# Patient Record
Sex: MALE | Race: WHITE | NOT HISPANIC OR LATINO | Employment: UNEMPLOYED | URBAN - METROPOLITAN AREA
[De-identification: names, ages, dates, MRNs, and addresses within clinical notes are randomized per-mention and may not be internally consistent; named-entity substitution may affect disease eponyms.]

---

## 2018-01-19 ENCOUNTER — OFFICE VISIT (OUTPATIENT)
Dept: MEDICAL GROUP | Facility: MEDICAL CENTER | Age: 18
End: 2018-01-19
Attending: INTERNAL MEDICINE
Payer: MEDICAID

## 2018-01-19 VITALS
HEART RATE: 100 BPM | OXYGEN SATURATION: 98 % | TEMPERATURE: 98.1 F | BODY MASS INDEX: 17.71 KG/M2 | DIASTOLIC BLOOD PRESSURE: 68 MMHG | SYSTOLIC BLOOD PRESSURE: 100 MMHG | RESPIRATION RATE: 16 BRPM | HEIGHT: 74 IN | WEIGHT: 138 LBS

## 2018-01-19 DIAGNOSIS — H35.52 USHER SYNDROME: ICD-10-CM

## 2018-01-19 DIAGNOSIS — M22.2X2 PATELLOFEMORAL PAIN SYNDROME OF BOTH KNEES: ICD-10-CM

## 2018-01-19 DIAGNOSIS — Z23 NEED FOR VACCINATION: ICD-10-CM

## 2018-01-19 DIAGNOSIS — H90.3 USHER SYNDROME: ICD-10-CM

## 2018-01-19 DIAGNOSIS — M22.2X1 PATELLOFEMORAL PAIN SYNDROME OF BOTH KNEES: ICD-10-CM

## 2018-01-19 PROCEDURE — 99204 OFFICE O/P NEW MOD 45 MIN: CPT | Performed by: INTERNAL MEDICINE

## 2018-01-19 PROCEDURE — 99203 OFFICE O/P NEW LOW 30 MIN: CPT | Mod: 25 | Performed by: INTERNAL MEDICINE

## 2018-01-19 PROCEDURE — 90686 IIV4 VACC NO PRSV 0.5 ML IM: CPT | Performed by: INTERNAL MEDICINE

## 2018-01-19 PROCEDURE — 90471 IMMUNIZATION ADMIN: CPT | Performed by: INTERNAL MEDICINE

## 2018-01-19 ASSESSMENT — PATIENT HEALTH QUESTIONNAIRE - PHQ9: CLINICAL INTERPRETATION OF PHQ2 SCORE: 0

## 2018-01-19 ASSESSMENT — PAIN SCALES - GENERAL: PAINLEVEL: NO PAIN

## 2018-01-19 NOTE — ASSESSMENT & PLAN NOTE
Patient reports having surgery on both of his knees for patellofemoral syndrome when he was in middle school, about 6 years ago. He reports that his knees are still a little bit unstable but the kneecaps no longer dislocated and he doesn't have too much pain related to this. He doesn't currently follow with an orthopedic surgeon.

## 2018-01-19 NOTE — PROGRESS NOTES
Kabriel Duane Hoover is a 17 y.o. male here for history of Usher syndrome, establish care, general physical exam  HPI:    Usher syndrome  Patient reports that he was diagnosed with posture syndrome by his ENT doctor, Dr. Rodriguez when he was about 12 years old. He currently wears hearing aids and he sees Dr. Rodriguez yearly. He uses [employer] for his hearing aid calibration.  He does not think he needs referrals to these places currently as he is already an established patient. For his vision loss, he sees Dr. Jurado. He reports that his glasses prescription has not changed recently. He feels that his hearing and vision have been stable for the past several years.     History of patellofemoral pain syndrome of both knees  Patient reports having surgery on both of his knees for patellofemoral syndrome when he was in middle school, about 6 years ago. He reports that his knees are still a little bit unstable but the kneecaps no longer dislocated and he doesn't have too much pain related to this. He doesn't currently follow with an orthopedic surgeon.    Current medicines (including changes today)  No current outpatient prescriptions on file.     No current facility-administered medications for this visit.      He  has a past medical history of Hearing loss; Patellofemoral arthralgia of both knees; Usher syndrome; and Vision loss.  He  has a past surgical history that includes other orthopedic surgery (2012).  Social History   Substance Use Topics   • Smoking status: Never Smoker   • Smokeless tobacco: Never Used   • Alcohol use No     Social History     Social History Narrative   • No narrative on file     Family History   Problem Relation Age of Onset   • Psychiatry Father      deperession   • Hypertension Father    • Psychiatry Brother    • Hypertension Paternal Grandmother    • Hypertension Paternal Grandfather    • Cancer Neg Hx          ROS  As above in HPI  All other systems reviewed and are negative      "Objective:     Blood pressure 100/68, pulse 100, temperature 36.7 °C (98.1 °F), resp. rate 16, height 1.88 m (6' 2\"), weight 62.6 kg (138 lb), SpO2 98 %. Body mass index is 17.72 kg/m².  Physical Exam:    Constitutional: Alert, no distress.  Skin: Warm, dry, good turgor, no rashes in visible areas.  Eye: Equal, round and reactive, conjunctiva clear, lids normal, wears glasses.  ENMT: Lips without lesions, good dentition, oropharynx clear, Hearing aids in place.  Neck: Trachea midline, no masses, no thyromegaly. No cervical or supraclavicular lymphadenopathy.  Respiratory: Unlabored respiratory effort, lungs clear to auscultation, no wheezes, no ronchi.  Cardiovascular: Normal S1, S2, no murmur, no edema.  Abdomen: Soft, non-tender, no masses, no hepatosplenomegaly.  Psych: Alert and oriented x3, normal affect and mood.      Assessment and Plan:   The following treatment plan was discussed    1. Usher syndrome  Patient is established with Dr. Rodriguez of ENT as well as [facility]. He also sees Dr. Jurado for his vision. He feels that his symptoms are stable at this time. He generally checks and with each specialists about once a year. He will continue specialty care.  -Continue follow-up with ENT, [facility], opthalmology    2. Patellofemoral pain syndrome of both knees  S/p surgical repair with good results.  Will continue to monitor    3. Need for vaccination  - Flu Quad Inj >3 Year Pre-Filled PF        Followup: Return if symptoms worsen or fail to improve, for annual.         "

## 2018-01-19 NOTE — ASSESSMENT & PLAN NOTE
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 Rodriguez when he was about 12 years old. He currently wears hearing aids and he sees Dr. Rodriguez yearly. He uses [employer] for his hearing aid calibration.  He does not think he needs referrals to these places currently as he is already an established patient. For his vision loss, he sees Dr. Jurado. He reports that his glasses prescription has not changed recently. He feels that his hearing and vision have been stable for the past several years.

## 2018-04-25 ENCOUNTER — OFFICE VISIT (OUTPATIENT)
Dept: MEDICAL GROUP | Facility: MEDICAL CENTER | Age: 18
End: 2018-04-25
Attending: INTERNAL MEDICINE
Payer: MEDICAID

## 2018-04-25 VITALS
SYSTOLIC BLOOD PRESSURE: 110 MMHG | WEIGHT: 146 LBS | TEMPERATURE: 97.9 F | HEART RATE: 98 BPM | DIASTOLIC BLOOD PRESSURE: 70 MMHG | BODY MASS INDEX: 18.74 KG/M2 | OXYGEN SATURATION: 97 % | HEIGHT: 74 IN | RESPIRATION RATE: 16 BRPM

## 2018-04-25 DIAGNOSIS — H00.014 HORDEOLUM EXTERNUM OF LEFT UPPER EYELID: ICD-10-CM

## 2018-04-25 PROCEDURE — 99213 OFFICE O/P EST LOW 20 MIN: CPT | Performed by: INTERNAL MEDICINE

## 2018-04-25 PROCEDURE — 99212 OFFICE O/P EST SF 10 MIN: CPT | Performed by: INTERNAL MEDICINE

## 2018-04-25 ASSESSMENT — PAIN SCALES - GENERAL: PAINLEVEL: NO PAIN

## 2018-04-25 NOTE — PROGRESS NOTES
"Subjective:   Kabriel Duane Hoover is a 18 y.o. male here today for left eyelid pain    Hordeolum externum of left upper eyelid  Patient reports that about 3 days ago, he developed a raised sore over the left eyelid. He states it looked like a pimple. He tried to pop it and did get a small amount of drainage. And, he has had increased tenderness and swelling of the eyelid. He denies changes in vision, eye watering or discharge, eye redness, eye pain.       Current medicines (including changes today)  No current outpatient prescriptions on file.     No current facility-administered medications for this visit.      He  has a past medical history of Hearing loss; Patellofemoral arthralgia of both knees; Usher syndrome; and Vision loss.    ROS   As above in HPI     Objective:     Blood pressure 110/70, pulse 98, temperature 36.6 °C (97.9 °F), resp. rate 16, height 1.88 m (6' 2.02\"), weight 66.2 kg (146 lb), SpO2 97 %. Body mass index is 18.74 kg/m².   Physical Exam:  Constitutional: Alert, no distress.  Skin: Warm, dry, good turgor, no rashes in visible areas.  Eye: Equal, round and reactive, conjunctiva clear, mild redness and swelling of the medial left upper lid consistent with a stye, no eye redness, no tenderness to palpation of the globe or periorbital region.  Psych: Alert and oriented x3, normal affect and mood.        Assessment and Plan:   The following treatment plan was discussed    1. Hordeolum externum of left upper eyelid  Exam is consistent with a stye. I advised patient to treat with warm compresses 4 times a day until the lesion resolves. We discussed that if there is no improvement with this therapy in the next week, he should let us know.  -Conservative management with warm compress 4 times a day        Followup: Return if symptoms worsen or fail to improve.         "

## 2018-04-25 NOTE — ASSESSMENT & PLAN NOTE
Patient reports that about 3 days ago, he developed a raised sore over the left eyelid. He states it looked like a pimple. He tried to pop it and did get a small amount of drainage. And, he has had increased tenderness and swelling of the eyelid. He denies changes in vision, eye watering or discharge, eye redness, eye pain.

## 2018-05-25 ENCOUNTER — OFFICE VISIT (OUTPATIENT)
Dept: MEDICAL GROUP | Facility: MEDICAL CENTER | Age: 18
End: 2018-05-25
Attending: FAMILY MEDICINE
Payer: MEDICAID

## 2018-05-25 VITALS
SYSTOLIC BLOOD PRESSURE: 112 MMHG | HEIGHT: 74 IN | OXYGEN SATURATION: 95 % | HEART RATE: 88 BPM | TEMPERATURE: 97.8 F | RESPIRATION RATE: 16 BRPM | BODY MASS INDEX: 18.1 KG/M2 | DIASTOLIC BLOOD PRESSURE: 58 MMHG | WEIGHT: 141 LBS

## 2018-05-25 DIAGNOSIS — K12.0 ORAL APHTHOUS ULCER: ICD-10-CM

## 2018-05-25 DIAGNOSIS — H00.014 HORDEOLUM EXTERNUM OF LEFT UPPER EYELID: ICD-10-CM

## 2018-05-25 PROCEDURE — 99213 OFFICE O/P EST LOW 20 MIN: CPT | Performed by: FAMILY MEDICINE

## 2018-05-25 PROCEDURE — 99212 OFFICE O/P EST SF 10 MIN: CPT | Performed by: FAMILY MEDICINE

## 2018-05-25 RX ORDER — CEPHALEXIN 500 MG/1
500 CAPSULE ORAL 3 TIMES DAILY
Qty: 21 CAP | Refills: 0 | Status: SHIPPED | OUTPATIENT
Start: 2018-05-25 | End: 2019-12-20

## 2018-05-25 ASSESSMENT — PAIN SCALES - GENERAL: PAINLEVEL: 3=SLIGHT PAIN

## 2018-05-25 NOTE — PROGRESS NOTES
"Subjective:      Kabriel Duane Hoover is a 18 y.o. male who presents with Dental Pain            HPI 1. Hordeolum-patient notes that previous in flames tender papule in the center of his left upper eyelid is partially improved for using warm compresses as recommended by Dr. Encinas at his last visit. There is been no mattering or drainage in his eye.  2. Oral aphthous ulcer-patient notes just onset the last 24 hours of an exquisitely tender spot above one of his left canine teeth on the gum immediately above the tooth. No tenderness over the tongue or sore throat.    ROS negative for blurred vision, difficulty swallowing, chest pain       Objective:     /58   Pulse 88   Temp 36.6 °C (97.8 °F)   Resp 16   Ht 1.88 m (6' 2.02\")   Wt 64 kg (141 lb)   SpO2 95%   BMI 18.10 kg/m²      Physical Exam  General-alert cooperative male in no acute distress  Oropharynx-4 mm shallow white-based erosions noted about 5-6 mm above the left sided upper jaw canine tooth. No significant swelling of the neighboring gum tissue. Tongue is midline and unremarkable. Oropharynx shows moist mucosa  Eyes-negative for conjunctival injection. There is a 3-4 mm nodule barely palpable in the lower central left upper eyelid. No mattering is seen on the eyelashes.          Assessment/Plan:     1. Hordeolum externum of left upper eyelid      2. Oral aphthous ulcer    Plan: 1. Trial of Keflex 500 mg 3 times a day ×7 days  2. Continue intermittent warm compresses to the left eye  3. If the left eyelid nodule grows into more of a pea-sized but nontender lesion would clinically suspected chalazion and arrange for ophthalmologic referral  4. Recommend ibuprofen or Tylenol to deal with pain associated with current oral ulcer    "

## 2018-07-03 ENCOUNTER — HOSPITAL ENCOUNTER (EMERGENCY)
Facility: MEDICAL CENTER | Age: 18
End: 2018-07-03
Attending: EMERGENCY MEDICINE
Payer: MEDICAID

## 2018-07-03 VITALS
OXYGEN SATURATION: 95 % | WEIGHT: 140 LBS | HEIGHT: 74 IN | SYSTOLIC BLOOD PRESSURE: 115 MMHG | BODY MASS INDEX: 17.97 KG/M2 | TEMPERATURE: 97.7 F | RESPIRATION RATE: 14 BRPM | HEART RATE: 92 BPM | DIASTOLIC BLOOD PRESSURE: 58 MMHG

## 2018-07-03 DIAGNOSIS — F41.9 ANXIETY: ICD-10-CM

## 2018-07-03 DIAGNOSIS — R25.1 OCCASIONAL TREMORS: ICD-10-CM

## 2018-07-03 DIAGNOSIS — T43.225A: ICD-10-CM

## 2018-07-03 LAB
ANION GAP SERPL CALC-SCNC: 13 MMOL/L (ref 0–11.9)
BASOPHILS # BLD AUTO: 0.5 % (ref 0–1.8)
BASOPHILS # BLD: 0.06 K/UL (ref 0–0.12)
BUN SERPL-MCNC: 18 MG/DL (ref 8–22)
CALCIUM SERPL-MCNC: 9.8 MG/DL (ref 8.5–10.5)
CHLORIDE SERPL-SCNC: 105 MMOL/L (ref 96–112)
CO2 SERPL-SCNC: 21 MMOL/L (ref 20–33)
CREAT SERPL-MCNC: 1.09 MG/DL (ref 0.5–1.4)
EOSINOPHIL # BLD AUTO: 0.05 K/UL (ref 0–0.51)
EOSINOPHIL NFR BLD: 0.4 % (ref 0–6.9)
ERYTHROCYTE [DISTWIDTH] IN BLOOD BY AUTOMATED COUNT: 37.2 FL (ref 35.9–50)
GLUCOSE SERPL-MCNC: 91 MG/DL (ref 65–99)
HCT VFR BLD AUTO: 47 % (ref 42–52)
HGB BLD-MCNC: 17.4 G/DL (ref 14–18)
IMM GRANULOCYTES # BLD AUTO: 0.04 K/UL (ref 0–0.11)
IMM GRANULOCYTES NFR BLD AUTO: 0.3 % (ref 0–0.9)
LYMPHOCYTES # BLD AUTO: 2.55 K/UL (ref 1–4.8)
LYMPHOCYTES NFR BLD: 21.6 % (ref 22–41)
MAGNESIUM SERPL-MCNC: 2 MG/DL (ref 1.5–2.5)
MCH RBC QN AUTO: 31.1 PG (ref 27–33)
MCHC RBC AUTO-ENTMCNC: 37 G/DL (ref 33.7–35.3)
MCV RBC AUTO: 83.9 FL (ref 81.4–97.8)
MONOCYTES # BLD AUTO: 0.58 K/UL (ref 0–0.85)
MONOCYTES NFR BLD AUTO: 4.9 % (ref 0–13.4)
NEUTROPHILS # BLD AUTO: 8.52 K/UL (ref 1.82–7.42)
NEUTROPHILS NFR BLD: 72.3 % (ref 44–72)
NRBC # BLD AUTO: 0 K/UL
NRBC BLD-RTO: 0 /100 WBC
PLATELET # BLD AUTO: 255 K/UL (ref 164–446)
PMV BLD AUTO: 9.8 FL (ref 9–12.9)
POTASSIUM SERPL-SCNC: 3.9 MMOL/L (ref 3.6–5.5)
RBC # BLD AUTO: 5.6 M/UL (ref 4.7–6.1)
SODIUM SERPL-SCNC: 139 MMOL/L (ref 135–145)
WBC # BLD AUTO: 11.8 K/UL (ref 4.8–10.8)

## 2018-07-03 PROCEDURE — 85025 COMPLETE CBC W/AUTO DIFF WBC: CPT

## 2018-07-03 PROCEDURE — 80048 BASIC METABOLIC PNL TOTAL CA: CPT

## 2018-07-03 PROCEDURE — 83735 ASSAY OF MAGNESIUM: CPT

## 2018-07-03 PROCEDURE — 96374 THER/PROPH/DIAG INJ IV PUSH: CPT

## 2018-07-03 PROCEDURE — 700111 HCHG RX REV CODE 636 W/ 250 OVERRIDE (IP): Performed by: EMERGENCY MEDICINE

## 2018-07-03 PROCEDURE — 99284 EMERGENCY DEPT VISIT MOD MDM: CPT

## 2018-07-03 RX ORDER — LORAZEPAM 2 MG/ML
0.5 INJECTION INTRAMUSCULAR ONCE
Status: COMPLETED | OUTPATIENT
Start: 2018-07-03 | End: 2018-07-03

## 2018-07-03 RX ADMIN — LORAZEPAM 0.5 MG: 2 INJECTION INTRAMUSCULAR; INTRAVENOUS at 18:14

## 2018-07-03 ASSESSMENT — PAIN SCALES - GENERAL: PAINLEVEL_OUTOF10: 6

## 2018-07-03 NOTE — ED TRIAGE NOTES
Pt to triage via hospital w/c.  Chief Complaint   Patient presents with   • Medication Reaction     possibly d/t zoloft, new medication, started shaking yesterday hand and torso, and starting two hours pt started having shaking all over, pt a/o x4 and talking through episode.     Pt having jerky movements, unable to control.  Charge informed of pt and working on room.  Pt to [facility], Pt Informed regarding triage process and verbalized understanding to inform triage tech or RN for any changes in condition.

## 2018-07-04 NOTE — ED NOTES
"Pt reports depression for past 4-5 months. This is the first medication attempted for treatment. Pt denies any other medication use at home. Pt removed hearing aids and gave them to his girlfriend and then to his aunt for safe keeping. Pt reports \"usher syndrome\" wearing hearing aids since 10th grade.   "

## 2018-07-04 NOTE — ED NOTES
Started zoloft 50mg 8 days ago and pt began fidgeting and squirming uncontrollably for past 2 hours. Girlfriend and aunt at bedside for support.

## 2018-07-04 NOTE — ED NOTES
Discussed Discharge instructions, F/U instructions, and medication instructions with Pt and family. Questions answered. Pt and family escorted out of ED in stable condition.

## 2018-07-04 NOTE — ED NOTES
"\"It happened the past 2 mornings but I was also scared and nervous so I don't know if that's the same thing but.\"  "

## 2018-07-04 NOTE — ED PROVIDER NOTES
ED Provider Note    Scribed for Carol Penn M.D. by Bre Ramirez. 7/3/2018, 5:21 PM.    Primary care provider: Devora Encinas M.D.  Means of arrival: Walk in  History obtained from: Patient  History limited by: None    CHIEF COMPLAINT  Chief Complaint   Patient presents with   • Medication Reaction     possibly d/t zoloft, new medication, started shaking yesterday hand and torso, and starting two hours pt started having shaking all over, pt a/o x4 and talking through episode.       HPI  Kabriel Duane Hoover is a 18 y.o. Male with a history of anxiety and PTSD who presents to the Emergency Department of medication reaction after he received Zoloft 8 days ago for anxiety by Dr. Robb, [facility]. Patient states that he began trembling yesterday, but the trembling ceased. However, this morning, his jaw began trembling and then his body began trembling 3 hours ago. He has never had any medications for anxiety in the past. He denies a need to flee or run away, nausea, diarrhea, fevers, or rashes. Patient reports that he is slightly constipated but is able to have a bowel movement and urinate. He has no known drug allergies.    REVIEW OF SYSTEMS  Pertinent positives include trembling. Pertinent negatives include no nausea, diarrhea, fevers, or rahses. E  See HPI for further details.     PAST MEDICAL HISTORY  Past Medical History:   Diagnosis Date   • Hearing loss    • Patellofemoral arthralgia of both knees    • Usher syndrome    • Vision loss    Anxiety and PTSD    SURGICAL HISTORY  Past Surgical History:   Procedure Laterality Date   • OTHER ORTHOPEDIC SURGERY  2012    bilateral knee surgery for patellofemoral syndrome       SOCIAL HISTORY  Social History   Substance Use Topics   • Smoking status: Never Smoker   • Smokeless tobacco: Never Used   • Alcohol use No      History   Drug Use No       FAMILY HISTORY  Family History   Problem Relation Age of Onset   • Psychiatry Father      deperession  "  • Hypertension Father    • Psychiatry Brother    • Hypertension Paternal Grandmother    • Hypertension Paternal Grandfather    • Cancer Neg Hx        CURRENT MEDICATIONS  Zoloft      ALLERGIES  No Known Allergies    PHYSICAL EXAM  VITAL SIGNS: /100   Pulse (!) 102   Temp 36.5 °C (97.7 °F) (Temporal)   Resp 20   Ht 1.88 m (6' 2\")   Wt 63.5 kg (140 lb)   SpO2 98%   BMI 17.97 kg/m²   Vitals reviewed.    Consitutional: Well-developed, well-nourished. Mild distress.  HENT: Normocephalic, right external ear normal, left external ear normal, oropharynx clear and moist.  Eyes: Conjunctivae normal, extraocular movements normal. Negative for: discharge in right and left eye, icterus. PERRLA at 4 mm  Neck: Range of motion normal, supple. Negative for cervical adenopathy.  Cardiovascular: Mild tachycardia, regular rhythm, heart sounds normal, intact distal pulses. Negative for: murmur, rub, gallop.  Pulmonary/Chest Wall: Effort normal, shallow breathing. Negative for: wheezes, rales, rhonchi.   Abdominal: Soft, bowel sounds normal. Negative for: distention, tenderness, rebound, guarding.  Musculoskeletal: Normal range of motion. Negative for edema. Muscular myoclonic movements of the torso and lower extremities that is fully distractible, no clonus   Neurological: Alert and oriented x3. No focal deficits  Skin: Warm, dry. Negative for rash.  Psych: Odd affect as the patient is calm while having convulsions.    DIAGNOSTIC STUDIES / PROCEDURES    LABS  Results for orders placed or performed during the hospital encounter of 07/03/18   CBC WITH DIFFERENTIAL   Result Value Ref Range    WBC 11.8 (H) 4.8 - 10.8 K/uL    RBC 5.60 4.70 - 6.10 M/uL    Hemoglobin 17.4 14.0 - 18.0 g/dL    Hematocrit 47.0 42.0 - 52.0 %    MCV 83.9 81.4 - 97.8 fL    MCH 31.1 27.0 - 33.0 pg    MCHC 37.0 (H) 33.7 - 35.3 g/dL    RDW 37.2 35.9 - 50.0 fL    Platelet Count 255 164 - 446 K/uL    MPV 9.8 9.0 - 12.9 fL    Neutrophils-Polys 72.30 (H) " 44.00 - 72.00 %    Lymphocytes 21.60 (L) 22.00 - 41.00 %    Monocytes 4.90 0.00 - 13.40 %    Eosinophils 0.40 0.00 - 6.90 %    Basophils 0.50 0.00 - 1.80 %    Immature Granulocytes 0.30 0.00 - 0.90 %    Nucleated RBC 0.00 /100 WBC    Neutrophils (Absolute) 8.52 (H) 1.82 - 7.42 K/uL    Lymphs (Absolute) 2.55 1.00 - 4.80 K/uL    Monos (Absolute) 0.58 0.00 - 0.85 K/uL    Eos (Absolute) 0.05 0.00 - 0.51 K/uL    Baso (Absolute) 0.06 0.00 - 0.12 K/uL    Immature Granulocytes (abs) 0.04 0.00 - 0.11 K/uL    NRBC (Absolute) 0.00 K/uL   BASIC METABOLIC PANEL   Result Value Ref Range    Sodium 139 135 - 145 mmol/L    Potassium 3.9 3.6 - 5.5 mmol/L    Chloride 105 96 - 112 mmol/L    Co2 21 20 - 33 mmol/L    Glucose 91 65 - 99 mg/dL    Bun 18 8 - 22 mg/dL    Creatinine 1.09 0.50 - 1.40 mg/dL    Calcium 9.8 8.5 - 10.5 mg/dL    Anion Gap 13.0 (H) 0.0 - 11.9   MAGNESIUM   Result Value Ref Range    Magnesium 2.0 1.5 - 2.5 mg/dL   ESTIMATED GFR   Result Value Ref Range    GFR If African American >60 >60 mL/min/1.73 m 2    GFR If Non African American >60 >60 mL/min/1.73 m 2     All labs reviewed by me.    COURSE & MEDICAL DECISION MAKING  Nursing notes, VS, PMSFHx reviewed in chart.    5:21 PM Patient seen and examined at bedside. The patient presents following a medication reaction and the differential diagnosis includes but is not limited to electrolyte abnormality, anxiety. Ordered estimated GFR, CBC, BMP, and magnesium. Patient will be treated with Ativan 0.5 mg for his symptoms.      6:30 PM Patient's labs were reviewed.    6:37 PM Patient was reevaluated at bedside. Patient is resting comfortably and reports that he is feeling better.  He stopped having tremors when the IV was placed prior to the administration of Ativan.  Discussed lab results with the patient and informed them that he will be discharged. The patient agrees to discharge home.     The patient will return for new or worsening symptoms and is stable at the time of  discharge.    DISPOSITION:  Patient will be discharged home in stable condition.    FOLLOW UP:  Devora Encinas M.D.  [address] [id number] [phone]    Call in 2 days        OUTPATIENT MEDICATIONS:  New Prescriptions    No medications on file         FINAL IMPRESSION  1. Occasional tremors    2. Adverse reaction to selective serotonin reuptake inhibitor, initial encounter    3. Anxiety          Bre REA (Scribe), am scribing for, and in the presence of, Carol Penn M.D..    Electronically signed by: Bre Ramirez (Scribsera), 7/3/2018    Carol REA M.D. personally performed the services described in this documentation, as scribed by Bre Ramirez in my presence, and it is both accurate and complete.    The note accurately reflects work and decisions made by me.  Carol Penn  7/4/2018  12:16 AM

## 2018-11-13 ENCOUNTER — TELEPHONE (OUTPATIENT)
Dept: MEDICAL GROUP | Facility: MEDICAL CENTER | Age: 18
End: 2018-11-13

## 2018-11-13 DIAGNOSIS — H35.52 USHER SYNDROME: ICD-10-CM

## 2018-11-13 DIAGNOSIS — H90.3 USHER SYNDROME: ICD-10-CM

## 2018-11-14 NOTE — TELEPHONE ENCOUNTER
Pt left message asking to have a referral to his ear doctor as the DR will not see him with out a referral.

## 2018-11-14 NOTE — TELEPHONE ENCOUNTER
Referral placed.  I will ask the referrals department to send to Dr. Rodriguez's office.  Please give patient the referrals department contact info in case he has any questions/problems.  Devora Encinas M.D.

## 2019-02-22 ENCOUNTER — TELEPHONE (OUTPATIENT)
Dept: MEDICAL GROUP | Facility: MEDICAL CENTER | Age: 19
End: 2019-02-22

## 2019-02-22 DIAGNOSIS — H90.3 USHER SYNDROME: ICD-10-CM

## 2019-02-22 DIAGNOSIS — H35.52 USHER SYNDROME: ICD-10-CM

## 2019-02-23 NOTE — TELEPHONE ENCOUNTER
Pt has an appointment scheduled for Wednesday with [facility] however they informed him today that he will need a referral for insurance to cover this appt.

## 2019-12-20 ENCOUNTER — OFFICE VISIT (OUTPATIENT)
Dept: MEDICAL GROUP | Facility: MEDICAL CENTER | Age: 19
End: 2019-12-20
Attending: INTERNAL MEDICINE
Payer: MEDICAID

## 2019-12-20 VITALS
RESPIRATION RATE: 16 BRPM | DIASTOLIC BLOOD PRESSURE: 74 MMHG | WEIGHT: 159 LBS | HEART RATE: 80 BPM | HEIGHT: 74 IN | TEMPERATURE: 98.5 F | OXYGEN SATURATION: 96 % | BODY MASS INDEX: 20.41 KG/M2 | SYSTOLIC BLOOD PRESSURE: 110 MMHG

## 2019-12-20 DIAGNOSIS — F33.2 SEVERE EPISODE OF RECURRENT MAJOR DEPRESSIVE DISORDER, WITHOUT PSYCHOTIC FEATURES (HCC): ICD-10-CM

## 2019-12-20 PROBLEM — F43.10 PTSD (POST-TRAUMATIC STRESS DISORDER): Status: ACTIVE | Noted: 2019-12-20

## 2019-12-20 PROCEDURE — 99212 OFFICE O/P EST SF 10 MIN: CPT | Performed by: INTERNAL MEDICINE

## 2019-12-20 PROCEDURE — 99214 OFFICE O/P EST MOD 30 MIN: CPT | Performed by: INTERNAL MEDICINE

## 2019-12-20 RX ORDER — LAMOTRIGINE 25 MG/1
25 TABLET ORAL DAILY
COMMUNITY
End: 2019-12-20 | Stop reason: SDUPTHER

## 2019-12-20 RX ORDER — LAMOTRIGINE 25 MG/1
TABLET ORAL
Qty: 42 TAB | Refills: 0 | Status: SHIPPED | OUTPATIENT
Start: 2019-12-20 | End: 2019-12-31 | Stop reason: SDUPTHER

## 2019-12-20 ASSESSMENT — PATIENT HEALTH QUESTIONNAIRE - PHQ9
5. POOR APPETITE OR OVEREATING: 1 - SEVERAL DAYS
SUM OF ALL RESPONSES TO PHQ QUESTIONS 1-9: 19
CLINICAL INTERPRETATION OF PHQ2 SCORE: 6

## 2019-12-20 ASSESSMENT — PAIN SCALES - GENERAL: PAINLEVEL: NO PAIN

## 2019-12-20 NOTE — ASSESSMENT & PLAN NOTE
He reports struggling with depression and PTSD in the past.  He was previously treated through the [facility] and also [facility].  His medication regimen included Seroquel and lamotrigine.  He states that the Seroquel slowed his thinking and interfered with his ability to work and go to school and he would like to avoid this medication but the Lamictal did work well for him.  He stopped taking it this summer as he was feeling better.  He believes his therapeutic dose was around 200 mg daily.  He currently has a therapist through [facility] which she sees once a week who also recommended he restart the medication.  He is working on getting an appointment with a psychiatrist at [facility] but is currently on a wait list.  He does endorse suicidal ideation but states that he would never act on them and he has never attempted suicide in the past.  He denies having a plan.  He lives with his girlfriend and has a good support system at home.  He also endorses anhedonia, difficulty concentrating, and fatigue.  Outside of the lamotrigine and Seroquel he is also tried Zoloft but he states that he was hospitalized after taking this due to inability to stop shaking and he tries to avoid medications in this class.

## 2019-12-20 NOTE — PROGRESS NOTES
Subjective:   Kabriel Duane Hoover is a 19 y.o. male here today for worsening depression    Severe episode of recurrent major depressive disorder (HCC)  He reports struggling with depression and PTSD in the past.  He was previously treated through the [facility] and also [facility].  His medication regimen included Seroquel and lamotrigine.  He states that the Seroquel slowed his thinking and interfered with his ability to work and go to school and he would like to avoid this medication but the Lamictal did work well for him.  He stopped taking it this summer as he was feeling better.  He believes his therapeutic dose was around 200 mg daily.  He currently has a therapist through [facility] which she sees once a week who also recommended he restart the medication.  He is working on getting an appointment with a psychiatrist at [facility] but is currently on a wait list.  He does endorse suicidal ideation but states that he would never act on them and he has never attempted suicide in the past.  He denies having a plan.  He lives with his girlfriend and has a good support system at home.  He also endorses anhedonia, difficulty concentrating, and fatigue.  Outside of the lamotrigine and Seroquel he is also tried Zoloft but he states that he was hospitalized after taking this due to inability to stop shaking and he tries to avoid medications in this class.         Current medicines (including changes today)  Current Outpatient Medications   Medication Sig Dispense Refill   • lamoTRIgine (LAMICTAL) 25 MG Tab Take 1 tab daily for 14 days then increase to 2 tabs daily 42 Tab 0     No current facility-administered medications for this visit.      He  has a past medical history of Hearing loss, Patellofemoral arthralgia of both knees, Usher syndrome, and Vision loss.    ROS   Denies chest pain, shortness of breath  As above in HPI     Objective:     Vitals:    12/20/19 0906   BP: 110/74   Pulse: 80    Resp: 16   Temp: 36.9 °C (98.5 °F)   SpO2: 96%     Body mass index is 20.41 kg/m².   Physical Exam:  Constitutional: Alert, no distress.  Skin: Warm, dry, good turgor, no rashes in visible areas.  Eye: Equal, round and reactive, conjunctiva clear, lids normal.  Psych: Alert and oriented x3, flat affect, depressed mood, denies active suicidal ideation    Assessment and Plan:   The following treatment plan was discussed    1. Severe episode of recurrent major depressive disorder, without psychotic features (HCC)  Symptoms have acutely worsened over the past 1 to 2 months related to increased stress.  At this point he would like to restart the lamotrigine which has been effective for him in the past.  He will continue follow-up with his therapist.  He is in line to establish again with a psychiatrist through [facility] clinic.  We discussed that I can start his medication for this month with an up titration of dose every 2 weeks.  Hopefully he will be in with psychiatry within the month and they can take over the prescription but if not he will let us know and we can continue increasing if needed to previous therapeutic dose of 100 mg.  We discussed that if the suicidal ideations ever worsen he should seek immediate medical attention.  -Continue close weekly follow-up with therapist  -He will establish with psychiatry at [facility].  We discussed in the event he is not able to do this he can call us and I will put in a referral to psychiatry.  - lamoTRIgine (LAMICTAL) 25 MG Tab; Take 1 tab daily for 14 days then increase to 2 tabs daily  Dispense: 42 Tab; Refill: 0        Followup: Return if symptoms worsen or fail to improve.

## 2019-12-31 DIAGNOSIS — F33.2 SEVERE EPISODE OF RECURRENT MAJOR DEPRESSIVE DISORDER, WITHOUT PSYCHOTIC FEATURES (HCC): ICD-10-CM

## 2020-01-02 RX ORDER — LAMOTRIGINE 25 MG/1
TABLET ORAL
Qty: 42 TAB | Refills: 0 | Status: SHIPPED | OUTPATIENT
Start: 2020-01-02 | End: 2020-01-30

## 2020-01-02 NOTE — TELEPHONE ENCOUNTER
Please call patient's pharmacy and clarify whether or not they received this prescription sent on 12/20/19.  I recently wrote it at his last visit so that he can increase the dose over time and we would need to change the instructions depending on if he was ever able to start on the medication.  If he has not been able to pick it up and I can resend it as was previously written.  Please let me know.    Devora Encinas M.D.

## 2020-01-30 DIAGNOSIS — F33.2 SEVERE EPISODE OF RECURRENT MAJOR DEPRESSIVE DISORDER, WITHOUT PSYCHOTIC FEATURES (HCC): ICD-10-CM

## 2020-01-30 RX ORDER — LAMOTRIGINE 25 MG/1
50 TABLET ORAL DAILY
Qty: 60 TAB | Refills: 0 | Status: SHIPPED | OUTPATIENT
Start: 2020-01-30 | End: 2020-02-07

## 2020-01-30 NOTE — TELEPHONE ENCOUNTER
Received request via: Pharmacy    Was the patient seen in the last year in this department? Yes    Does the patient have an active prescription (recently filled or refills available) for medication(s) requested? No

## 2020-02-05 ENCOUNTER — TELEPHONE (OUTPATIENT)
Dept: MEDICAL GROUP | Facility: MEDICAL CENTER | Age: 20
End: 2020-02-05

## 2020-02-06 DIAGNOSIS — F33.2 SEVERE EPISODE OF RECURRENT MAJOR DEPRESSIVE DISORDER, WITHOUT PSYCHOTIC FEATURES (HCC): ICD-10-CM

## 2020-02-06 RX ORDER — LAMOTRIGINE 25 MG/1
50 TABLET ORAL DAILY
Qty: 60 TAB | Refills: 0 | Status: CANCELLED | OUTPATIENT
Start: 2020-02-06

## 2020-02-07 RX ORDER — LAMOTRIGINE 100 MG/1
150 TABLET ORAL DAILY
Qty: 21 TAB | Refills: 0 | Status: SHIPPED | OUTPATIENT
Start: 2020-02-07 | End: 2020-02-21

## 2020-02-07 NOTE — TELEPHONE ENCOUNTER
I will give him a 2 week Rx - Dr. Encinas's last note states that patient can take up to 100mg daily until seen by psychiatry. 150mg a day is ok as long as he isn't having any side effects. Please ask patient to touch base with Dr. Encinas upon her return. Thank you!

## 2020-02-07 NOTE — TELEPHONE ENCOUNTER
Received request via: Patient    Was the patient seen in the last year in this department? Yes    Does the patient have an active prescription (recently filled or refills available) for medication(s) requested? No       ####pt states that he was directed to increase his dosage gradually, Pt states he is up to 150 mg a day. With current Rx pt is running out of medications to soon. Pt is asking to have Rx written to cover the dosage increase #####

## 2020-02-11 ENCOUNTER — TELEPHONE (OUTPATIENT)
Dept: MEDICAL GROUP | Facility: MEDICAL CENTER | Age: 20
End: 2020-02-11

## 2020-02-12 NOTE — TELEPHONE ENCOUNTER
1. Caller Name: Kervin                        Call Back Number: [phone] (home)       How would the patient prefer to be contacted with a response: Phone call OK to leave a detailed message    Pt left message stating that he was able to get in with [facility] and will have new provider prescribe the lamotrigine

## 2020-03-30 ENCOUNTER — TELEPHONE (OUTPATIENT)
Dept: HEALTH INFORMATION MANAGEMENT | Facility: OTHER | Age: 20
End: 2020-03-30

## 2020-03-30 ENCOUNTER — OFFICE VISIT (OUTPATIENT)
Dept: URGENT CARE | Facility: CLINIC | Age: 20
End: 2020-03-30
Payer: MEDICAID

## 2020-03-30 ENCOUNTER — OFFICE VISIT (OUTPATIENT)
Dept: URGENT CARE | Facility: MEDICAL CENTER | Age: 20
End: 2020-03-30
Payer: MEDICAID

## 2020-03-30 VITALS
HEIGHT: 73 IN | DIASTOLIC BLOOD PRESSURE: 80 MMHG | BODY MASS INDEX: 20.01 KG/M2 | HEART RATE: 94 BPM | WEIGHT: 151 LBS | RESPIRATION RATE: 18 BRPM | TEMPERATURE: 98.2 F | SYSTOLIC BLOOD PRESSURE: 104 MMHG | OXYGEN SATURATION: 99 %

## 2020-03-30 VITALS — WEIGHT: 154 LBS | HEIGHT: 73 IN | BODY MASS INDEX: 20.41 KG/M2 | TEMPERATURE: 97.2 F

## 2020-03-30 DIAGNOSIS — R07.89 SENSATION OF CHEST TIGHTNESS: ICD-10-CM

## 2020-03-30 DIAGNOSIS — R05.9 COUGH: ICD-10-CM

## 2020-03-30 DIAGNOSIS — R06.02 SOB (SHORTNESS OF BREATH): ICD-10-CM

## 2020-03-30 DIAGNOSIS — J06.9 VIRAL URI WITH COUGH: ICD-10-CM

## 2020-03-30 PROCEDURE — 99214 OFFICE O/P EST MOD 30 MIN: CPT | Performed by: NURSE PRACTITIONER

## 2020-03-30 PROCEDURE — 99203 OFFICE O/P NEW LOW 30 MIN: CPT | Mod: 95,CR | Performed by: PHYSICIAN ASSISTANT

## 2020-03-30 RX ORDER — BUPROPION HYDROCHLORIDE 150 MG/1
TABLET ORAL DAILY
COMMUNITY
Start: 2020-03-09

## 2020-03-30 RX ORDER — LAMOTRIGINE 200 MG/1
TABLET ORAL DAILY
COMMUNITY
Start: 2020-03-09

## 2020-03-30 RX ORDER — ALBUTEROL SULFATE 90 UG/1
2 AEROSOL, METERED RESPIRATORY (INHALATION) EVERY 6 HOURS PRN
Qty: 8.5 G | Refills: 0 | Status: SHIPPED | OUTPATIENT
Start: 2020-03-30

## 2020-03-30 ASSESSMENT — ENCOUNTER SYMPTOMS
WHEEZING: 0
SPUTUM PRODUCTION: 0
SHORTNESS OF BREATH: 1
EYE REDNESS: 0
FEVER: 0
SHORTNESS OF BREATH: 1
RHINORRHEA: 0
VOMITING: 0
DIARRHEA: 0
COUGH: 1
SPUTUM PRODUCTION: 0
SORE THROAT: 0
SORE THROAT: 1
COUGH: 1
MYALGIAS: 0
EYE DISCHARGE: 0
HEADACHES: 0
FEVER: 0
RHINORRHEA: 0

## 2020-03-30 ASSESSMENT — COPD QUESTIONNAIRES: COPD: 0

## 2020-03-30 NOTE — LETTER
March 30, 2020    To Whom It May Concern:         This is confirmation that Kabriel Duane Hoover attended his scheduled appointment with VICTOR M Beck on 3/30/20.  This patient was seen in clinic and needs to remain off of work until his symptoms resolve.  Please excuse him from work.      For more detailed information regarding COVID testing see the CDC website.         If you have any questions please do not hesitate to call me at the phone number listed below.    Sincerely,          Sukh Little A.P.R.N  [phone]

## 2020-03-30 NOTE — PROGRESS NOTES
"Subjective:      Kabriel Duane Hoover is a 19 y.o. male who presents with Cough (sore throat, sob X4 days )    Encounter was completed using a secure and encrypted videoconferencing equipment, the patient gave verbal consent and patient identification was confirmed.            Patient is a 19-year-old male via telemedicine with complaint of shortness of breath and a slight dry cough for the last 4 days.  Patient reports in general his cough has improved however his shortness of breath has lingered.  Patient specifically denies any fevers, chills, sore throat or noted congestion.  He does report a slight \"throat tickle \"however denies any soreness.  Patient admits that he has been utilizing cough drops for the cough with mild improvement.  He admits he also used his girlfriend's albuterol inhaler however this did not appear to make a difference with his shortness of breath.  He admits that sitting at rest improves his symptoms however if he is up standing walking around and feels as though he is \"dropping\".  He denies prior history of same in the past and specifically denies any wheezing, asthma or specific lung history.  He denies any ill contacts, recent travels.    Cough   This is a new problem. Episode onset: 4 days ago. The problem has been gradually improving. The cough is non-productive. Associated symptoms include shortness of breath. Pertinent negatives include no eye redness, fever, myalgias, nasal congestion, postnasal drip, rash, rhinorrhea, sore throat or wheezing. The symptoms are aggravated by exercise (standing, movement. ). He has tried a beta-agonist inhaler (cough drops. ) for the symptoms. There is no history of asthma.       Review of Systems   Constitutional: Negative for fever and malaise/fatigue.   HENT: Negative for congestion, postnasal drip, rhinorrhea and sore throat.    Eyes: Negative for discharge and redness.   Respiratory: Positive for cough and shortness of breath. Negative for sputum " "production and wheezing.    Gastrointestinal: Negative for diarrhea and vomiting.   Genitourinary: Negative for dysuria, frequency and urgency.   Musculoskeletal: Negative for myalgias.   Skin: Negative for rash.   All other systems reviewed and are negative.         Objective:     Temp 36.2 °C (97.2 °F) (Oral)   Ht 1.854 m (6' 1\")   Wt 69.9 kg (154 lb)   BMI 20.32 kg/m²    PMH:  has a past medical history of Hearing loss, Patellofemoral arthralgia of both knees, Usher syndrome, and Vision loss.  MEDS: Reviewed .   ALLERGIES: No Known Allergies  SURGHX:   Past Surgical History:   Procedure Laterality Date   • OTHER ORTHOPEDIC SURGERY  2012    bilateral knee surgery for patellofemoral syndrome     SOCHX:  reports that he has never smoked. He has never used smokeless tobacco. He reports that he does not drink alcohol or use drugs.  FH: Family history was reviewed, no pertinent findings to report    Physical Exam         Constitutional: Alert, no distress, well-groomed.  Skin: No rashes in visible areas.  Eye: Round. Conjunctiva clear, lids normal. No icterus.   ENMT: Lips pink without lesions, good dentition, moist mucous membranes. Phonation normal.  Neck: No masses, no thyromegaly. Moves freely without pain.  CV: Pulse as reported by patient  Respiratory: Unlabored respiratory effort, no cough or audible wheeze  Psych: Alert and oriented x3, normal affect and mood.        Assessment/Plan:       1. SOB (shortness of breath)  2. Cough    Due to limitations for telemedicine I do feel that patient needs evaluation and clinic to formally address his shortness of breath.  On exam patient was talking in full sentences without any evidence of respiratory distress although still with subjective shortness of breath.  Unable to check pulse ox or even conducted lung examination of which I do feel it is warranted to evaluate this patient.  Strongly encourage patient to have evaluation at a respiratory site for evaluation of " the symptoms.  Patient is very agreeable and understands at this time.  Patient was stable throughout the duration of the visit today.

## 2020-03-30 NOTE — PROGRESS NOTES
Subjective:      Kabriel Duane Hoover is a 19 y.o. male who presents with Cough (x5 days-dry cough) and Shortness of Breath            Cough   This is a new problem. Episode onset: pt reports new onset of cough and feeling SOB that started 4 days ago. He states the SOB is most bothersome. Dyspnea with exertion and rest. No recent fevers, body aches or chills. No recent COVID contacts or travel. The problem has been unchanged. The cough is non-productive. Associated symptoms include a sore throat (mild) and shortness of breath. Pertinent negatives include no ear congestion, ear pain, fever, headaches or rhinorrhea. Treatments tried: cough drops and vitamin C. States he did try his girlfriends albuterol inhaler. The treatment provided no relief. There is no history of asthma, COPD or pneumonia.       Review of Systems   Constitutional: Negative for fever.   HENT: Positive for sore throat (mild). Negative for ear pain and rhinorrhea.    Respiratory: Positive for cough and shortness of breath. Negative for sputum production.    Neurological: Negative for headaches.   All other systems reviewed and are negative.    Past Medical History:   Diagnosis Date   • Hearing loss    • Patellofemoral arthralgia of both knees    • Usher syndrome    • Vision loss       Past Surgical History:   Procedure Laterality Date   • OTHER ORTHOPEDIC SURGERY  2012    bilateral knee surgery for patellofemoral syndrome      Social History     Socioeconomic History   • Marital status: Single     Spouse name: Not on file   • Number of children: Not on file   • Years of education: Not on file   • Highest education level: Not on file   Occupational History   • Not on file   Social Needs   • Financial resource strain: Not on file   • Food insecurity     Worry: Not on file     Inability: Not on file   • Transportation needs     Medical: Not on file     Non-medical: Not on file   Tobacco Use   • Smoking status: Never Smoker   • Smokeless tobacco: Never  "Used   Substance and Sexual Activity   • Alcohol use: No   • Drug use: No   • Sexual activity: Yes     Partners: Female     Birth control/protection: Pill   Lifestyle   • Physical activity     Days per week: Not on file     Minutes per session: Not on file   • Stress: Not on file   Relationships   • Social connections     Talks on phone: Not on file     Gets together: Not on file     Attends Latter-day service: Not on file     Active member of club or organization: Not on file     Attends meetings of clubs or organizations: Not on file     Relationship status: Not on file   • Intimate partner violence     Fear of current or ex partner: Not on file     Emotionally abused: Not on file     Physically abused: Not on file     Forced sexual activity: Not on file   Other Topics Concern   • Not on file   Social History Narrative   • Not on file          Objective:     /80 (BP Location: Left arm)   Pulse 94   Temp 36.8 °C (98.2 °F) (Temporal)   Resp 18   Ht 1.854 m (6' 1\")   Wt 68.5 kg (151 lb)   SpO2 99%   BMI 19.92 kg/m²      Physical Exam  Vitals signs and nursing note reviewed.   Constitutional:       Appearance: Normal appearance. He is normal weight.   HENT:      Head: Normocephalic and atraumatic.      Right Ear: Tympanic membrane and external ear normal.      Left Ear: Tympanic membrane and external ear normal.      Nose: Rhinorrhea present.      Mouth/Throat:      Mouth: Mucous membranes are moist.      Pharynx: Oropharynx is clear.   Eyes:      Extraocular Movements: Extraocular movements intact.      Pupils: Pupils are equal, round, and reactive to light.   Neck:      Musculoskeletal: Normal range of motion.   Cardiovascular:      Rate and Rhythm: Normal rate and regular rhythm.   Pulmonary:      Effort: Pulmonary effort is normal.      Breath sounds: Normal breath sounds.   Musculoskeletal: Normal range of motion.   Lymphadenopathy:      Head:      Right side of head: Submandibular adenopathy present. "      Left side of head: Submandibular adenopathy present.   Skin:     General: Skin is warm and dry.      Capillary Refill: Capillary refill takes less than 2 seconds.   Neurological:      General: No focal deficit present.      Mental Status: He is alert and oriented to person, place, and time. Mental status is at baseline.   Psychiatric:         Mood and Affect: Mood normal.         Speech: Speech normal.         Thought Content: Thought content normal.         Judgment: Judgment normal.                 Assessment/Plan:       1. Sensation of chest tightness  - albuterol 108 (90 Base) MCG/ACT Aero Soln inhalation aerosol; Inhale 2 Puffs by mouth every 6 hours as needed for Shortness of Breath.  Dispense: 8.5 g; Refill: 0    2. Viral URI with cough  - albuterol 108 (90 Base) MCG/ACT Aero Soln inhalation aerosol; Inhale 2 Puffs by mouth every 6 hours as needed for Shortness of Breath.  Dispense: 8.5 g; Refill: 0    Discussed with patient symptoms are viral in nature, there is low concern for any respiratory infection currently. Antibiotics are not advised at this time.  VSS, exam is benign  Reassured pt his symptoms are viral and mild  Encouraged rest, fluids and supportive care  Please remain out of work for 7 days from the onset of symptoms  He understands and agrees  Supportive care, differential diagnoses, and indications for immediate follow-up discussed with patient.    Pathogenesis of diagnosis discussed including typical length and natural progression.      Instructed to return to  or nearest emergency department if symptoms fail to improve, for any change in condition, further concerns, or new concerning symptoms.  Patient states understanding of the plan of care and discharge instructions.

## 2020-03-30 NOTE — TELEPHONE ENCOUNTER
1. Caller Name: Kervin                        Call Back Number: [phone]  [facility] PCP or Specialty Provider: Yes Devora Encinas        2.  Does patient have any active symptoms of respiratory illness (fever OR cough OR shortness of breath OR sore throat)? Yes, the patient reports the following respiratory symptoms: cough and shortness of breath x 5 days    3.  Does patient have any comoribidities? None     4.  Has the patient traveled in the last 14 days OR had any known contact with someone who is suspected or confirmed to have COVID-19?  No.    5. Disposition: Offered patient virtual visit to limit potential exposure to others; patient also given self care instructions. Transferred to schedule virtual UC visit.     Note routed to [facility] Provider: SERENEI only.

## 2021-03-31 NOTE — TELEPHONE ENCOUNTER
Referral placed, please notify patient.    Devora Encinas M.D.     Detail Level: Zone Post-Care Instructions: I reviewed with the patient in detail post-care instructions. Patient is to wear sunprotection, and avoid picking at any of the treated lesions. Pt may apply Vaseline to crusted or scabbing areas. Consent: The patient's consent was obtained including but not limited to risks of crusting, scabbing, blistering, scarring, darker or lighter pigmentary change, recurrence, incomplete removal and infection. Render Post-Care Instructions In Note?: no Duration Of Freeze Thaw-Cycle (Seconds): 0

## 2024-02-16 ENCOUNTER — APPOINTMENT (OUTPATIENT)
Dept: RADIOLOGY | Facility: MEDICAL CENTER | Age: 24
End: 2024-02-16
Attending: EMERGENCY MEDICINE
Payer: COMMERCIAL

## 2024-02-16 ENCOUNTER — HOSPITAL ENCOUNTER (EMERGENCY)
Facility: MEDICAL CENTER | Age: 24
End: 2024-02-16
Attending: EMERGENCY MEDICINE
Payer: COMMERCIAL

## 2024-02-16 VITALS
DIASTOLIC BLOOD PRESSURE: 85 MMHG | HEIGHT: 73 IN | TEMPERATURE: 98.7 F | SYSTOLIC BLOOD PRESSURE: 142 MMHG | OXYGEN SATURATION: 97 % | RESPIRATION RATE: 18 BRPM | HEART RATE: 103 BPM | WEIGHT: 146.16 LBS | BODY MASS INDEX: 19.37 KG/M2

## 2024-02-16 DIAGNOSIS — R05.3 CHRONIC COUGH: ICD-10-CM

## 2024-02-16 LAB
ALBUMIN SERPL BCP-MCNC: 4.7 G/DL (ref 3.2–4.9)
ALBUMIN/GLOB SERPL: 1.7 G/DL
ALP SERPL-CCNC: 80 U/L (ref 30–99)
ALT SERPL-CCNC: 119 U/L (ref 2–50)
ANION GAP SERPL CALC-SCNC: 12 MMOL/L (ref 7–16)
AST SERPL-CCNC: 61 U/L (ref 12–45)
BASOPHILS # BLD AUTO: 1.1 % (ref 0–1.8)
BASOPHILS # BLD: 0.1 K/UL (ref 0–0.12)
BILIRUB SERPL-MCNC: 0.7 MG/DL (ref 0.1–1.5)
BUN SERPL-MCNC: 14 MG/DL (ref 8–22)
CALCIUM ALBUM COR SERPL-MCNC: 8.4 MG/DL (ref 8.5–10.5)
CALCIUM SERPL-MCNC: 9 MG/DL (ref 8.5–10.5)
CHLORIDE SERPL-SCNC: 102 MMOL/L (ref 96–112)
CO2 SERPL-SCNC: 24 MMOL/L (ref 20–33)
CREAT SERPL-MCNC: 1.1 MG/DL (ref 0.5–1.4)
D DIMER PPP IA.FEU-MCNC: 0.31 UG/ML (FEU) (ref 0–0.5)
EOSINOPHIL # BLD AUTO: 0.36 K/UL (ref 0–0.51)
EOSINOPHIL NFR BLD: 4.1 % (ref 0–6.9)
ERYTHROCYTE [DISTWIDTH] IN BLOOD BY AUTOMATED COUNT: 41 FL (ref 35.9–50)
GFR SERPLBLD CREATININE-BSD FMLA CKD-EPI: 95 ML/MIN/1.73 M 2
GLOBULIN SER CALC-MCNC: 2.8 G/DL (ref 1.9–3.5)
GLUCOSE SERPL-MCNC: 79 MG/DL (ref 65–99)
HCT VFR BLD AUTO: 50.3 % (ref 42–52)
HGB BLD-MCNC: 18.1 G/DL (ref 14–18)
IMM GRANULOCYTES # BLD AUTO: 0.03 K/UL (ref 0–0.11)
IMM GRANULOCYTES NFR BLD AUTO: 0.3 % (ref 0–0.9)
LYMPHOCYTES # BLD AUTO: 2.18 K/UL (ref 1–4.8)
LYMPHOCYTES NFR BLD: 24.8 % (ref 22–41)
MCH RBC QN AUTO: 31.2 PG (ref 27–33)
MCHC RBC AUTO-ENTMCNC: 36 G/DL (ref 32.3–36.5)
MCV RBC AUTO: 86.6 FL (ref 81.4–97.8)
MONOCYTES # BLD AUTO: 0.64 K/UL (ref 0–0.85)
MONOCYTES NFR BLD AUTO: 7.3 % (ref 0–13.4)
NEUTROPHILS # BLD AUTO: 5.47 K/UL (ref 1.82–7.42)
NEUTROPHILS NFR BLD: 62.4 % (ref 44–72)
NRBC # BLD AUTO: 0 K/UL
NRBC BLD-RTO: 0 /100 WBC (ref 0–0.2)
PLATELET # BLD AUTO: 301 K/UL (ref 164–446)
PMV BLD AUTO: 9 FL (ref 9–12.9)
POTASSIUM SERPL-SCNC: 4.1 MMOL/L (ref 3.6–5.5)
PROT SERPL-MCNC: 7.5 G/DL (ref 6–8.2)
RBC # BLD AUTO: 5.81 M/UL (ref 4.7–6.1)
SODIUM SERPL-SCNC: 138 MMOL/L (ref 135–145)
WBC # BLD AUTO: 8.8 K/UL (ref 4.8–10.8)

## 2024-02-16 PROCEDURE — 71045 X-RAY EXAM CHEST 1 VIEW: CPT

## 2024-02-16 PROCEDURE — 85025 COMPLETE CBC W/AUTO DIFF WBC: CPT

## 2024-02-16 PROCEDURE — 700101 HCHG RX REV CODE 250: Performed by: EMERGENCY MEDICINE

## 2024-02-16 PROCEDURE — 87798 DETECT AGENT NOS DNA AMP: CPT

## 2024-02-16 PROCEDURE — 80053 COMPREHEN METABOLIC PANEL: CPT

## 2024-02-16 PROCEDURE — 85379 FIBRIN DEGRADATION QUANT: CPT

## 2024-02-16 PROCEDURE — 94640 AIRWAY INHALATION TREATMENT: CPT

## 2024-02-16 PROCEDURE — 36415 COLL VENOUS BLD VENIPUNCTURE: CPT

## 2024-02-16 PROCEDURE — 99283 EMERGENCY DEPT VISIT LOW MDM: CPT

## 2024-02-16 RX ORDER — BENZONATATE 100 MG/1
100 CAPSULE ORAL 3 TIMES DAILY PRN
Qty: 15 CAPSULE | Refills: 2 | Status: SHIPPED | OUTPATIENT
Start: 2024-02-16

## 2024-02-16 RX ADMIN — IPRATROPIUM BROMIDE 0.5 MG: 0.5 SOLUTION RESPIRATORY (INHALATION) at 10:54

## 2024-02-16 RX ADMIN — ALBUTEROL SULFATE 2.5 MG: 2.5 SOLUTION RESPIRATORY (INHALATION) at 10:55

## 2024-02-16 NOTE — ED PROVIDER NOTES
"ED Provider Note    CHIEF COMPLAINT  Chief Complaint   Patient presents with    Cough     For the last 4 weeks, inflamed lungs per dr notes, tried steroid and no change, than started a inhaler with steroid and no change, told to come into ER        EXTERNAL RECORDS REVIEWED  Outpatient Notes 2 view chest x-ray from February 6, 2024 read as hyperinflation, and otherwise negative    HPI/ROS  LIMITATION TO HISTORY   Select: : None  OUTSIDE HISTORIAN(S):  Family at bedside for discussion history and symptoms    Kabriel Duane Hoover is a 23 y.o. male who presents with 4 weeks of a cough.  He stated it started as cough and cold symptoms, aches and fever went away after the first week, has had persistent cough since.  Patient states he is tested negative for COVID twice, starting 2 weeks ago then 1 week ago.  Patient had outpatient workup including chest x-ray 10 days ago which was negative.  He states albuterol several times he has tried did not help.  He is taking oral inhaled steroids over the last week without improvement.  At times when he sits still, he does not cough however any attempt with activity or talking prompts the cough.  He denies foreign body ingestion.  He does not smoke or use vape pen.  No family history of DVT or pulmonary embolism.  He denies leg swelling, no personal history of DVT or pulmonary embolism.  No hemoptysis.  Cough is \"dry\".  No foreign body sensation in his throat.  No leg swelling.  Patient started twice a week ketamine treatments last October for psychiatric issue.  Patient was also treated with a course of Augmentin 2 weeks ago for otitis media.  He states the antibiotic did not help his cough    PAST MEDICAL HISTORY   has a past medical history of Hearing loss, Patellofemoral arthralgia of both knees, Usher syndrome, and Vision loss.    SURGICAL HISTORY   has a past surgical history that includes other orthopedic surgery (2012).    FAMILY HISTORY  Family History   Problem Relation " "Age of Onset    Psychiatric Illness Father         deperession    Hypertension Father     Psychiatric Illness Brother     Hypertension Paternal Grandmother     Hypertension Paternal Grandfather     Cancer Neg Hx        SOCIAL HISTORY  Social History     Tobacco Use    Smoking status: Never    Smokeless tobacco: Never   Vaping Use    Vaping Use: Never used   Substance and Sexual Activity    Alcohol use: No    Drug use: No    Sexual activity: Yes     Partners: Female     Birth control/protection: Pill       CURRENT MEDICATIONS  Home Medications       Reviewed by Vandana Krause R.N. (Registered Nurse) on 02/16/24 at 1006  Med List Status: Partial     Medication Last Dose Status   albuterol 108 (90 Base) MCG/ACT Aero Soln inhalation aerosol  Active   buPROPion (WELLBUTRIN XL) 150 MG XL tablet  Active   lamotrigine (LAMICTAL) 200 MG tablet  Active                    ALLERGIES  Allergies   Allergen Reactions    Zoloft [Sertraline]        PHYSICAL EXAM  VITAL SIGNS: /81   Pulse (!) 122   Temp 35.9 °C (96.7 °F) (Temporal)   Resp 18   Ht 1.854 m (6' 1\")   Wt 66.3 kg (146 lb 2.6 oz)   SpO2 98%   BMI 19.28 kg/m²    Respiratory: Moderate bilateral air movement, no crackles.  Mild wheeze with cough.  Cardiac: Tachycardic, regular rhythm  Skin: No jaundice, no cyanosis, no peripheral edema  GI: Abdomen is soft and nontender  Muscle skeletal: Mild sternal tenderness  Psychiatric: Normal mood, calm and cooperative        DIAGNOSTIC STUDIES / PROCEDURES      LABS  Results for orders placed or performed during the hospital encounter of 02/16/24   CBC WITH DIFFERENTIAL   Result Value Ref Range    WBC 8.8 4.8 - 10.8 K/uL    RBC 5.81 4.70 - 6.10 M/uL    Hemoglobin 18.1 (H) 14.0 - 18.0 g/dL    Hematocrit 50.3 42.0 - 52.0 %    MCV 86.6 81.4 - 97.8 fL    MCH 31.2 27.0 - 33.0 pg    MCHC 36.0 32.3 - 36.5 g/dL    RDW 41.0 35.9 - 50.0 fL    Platelet Count 301 164 - 446 K/uL    MPV 9.0 9.0 - 12.9 fL    Neutrophils-Polys 62.40 " 44.00 - 72.00 %    Lymphocytes 24.80 22.00 - 41.00 %    Monocytes 7.30 0.00 - 13.40 %    Eosinophils 4.10 0.00 - 6.90 %    Basophils 1.10 0.00 - 1.80 %    Immature Granulocytes 0.30 0.00 - 0.90 %    Nucleated RBC 0.00 0.00 - 0.20 /100 WBC    Neutrophils (Absolute) 5.47 1.82 - 7.42 K/uL    Lymphs (Absolute) 2.18 1.00 - 4.80 K/uL    Monos (Absolute) 0.64 0.00 - 0.85 K/uL    Eos (Absolute) 0.36 0.00 - 0.51 K/uL    Baso (Absolute) 0.10 0.00 - 0.12 K/uL    Immature Granulocytes (abs) 0.03 0.00 - 0.11 K/uL    NRBC (Absolute) 0.00 K/uL   COMP METABOLIC PANEL   Result Value Ref Range    Sodium 138 135 - 145 mmol/L    Potassium 4.1 3.6 - 5.5 mmol/L    Chloride 102 96 - 112 mmol/L    Co2 24 20 - 33 mmol/L    Anion Gap 12.0 7.0 - 16.0    Glucose 79 65 - 99 mg/dL    Bun 14 8 - 22 mg/dL    Creatinine 1.10 0.50 - 1.40 mg/dL    Calcium 9.0 8.5 - 10.5 mg/dL    Correct Calcium 8.4 (L) 8.5 - 10.5 mg/dL    AST(SGOT) 61 (H) 12 - 45 U/L    ALT(SGPT) 119 (H) 2 - 50 U/L    Alkaline Phosphatase 80 30 - 99 U/L    Total Bilirubin 0.7 0.1 - 1.5 mg/dL    Albumin 4.7 3.2 - 4.9 g/dL    Total Protein 7.5 6.0 - 8.2 g/dL    Globulin 2.8 1.9 - 3.5 g/dL    A-G Ratio 1.7 g/dL   D-DIMER   Result Value Ref Range    D-Dimer 0.31 0.00 - 0.50 ug/mL (FEU)   ESTIMATED GFR   Result Value Ref Range    GFR (CKD-EPI) 95 >60 mL/min/1.73 m 2         RADIOLOGY  I have independently interpreted the diagnostic imaging associated with this visit and am waiting the final reading from the radiologist.   My preliminary interpretation is as follows: Chest x-ray negative for pneumonia, no pneumothorax  Radiologist interpretation:   DX-CHEST-PORTABLE (1 VIEW)   Final Result      No acute cardiopulmonary disease evident.            COURSE & MEDICAL DECISION MAKING    ED Observation Status? No; Patient does not meet criteria for ED Observation.     INITIAL ASSESSMENT, COURSE AND PLAN  Care Narrative: Patient with extremely frequent coughing for many weeks after URI symptoms.   Was treated unsuccessfully with oral steroids, Augmentin.  He is 1 week into inhaled steroids and has been encouraged to finish this prescription.  Patient received albuterol and Atrovent breathing treatment in the ER with some improvement of his coughing.  He is encouraged to use his albuterol at home up to every 4 hours as needed.  Postviral inflammation with cough possible etiology.  I have sent pertussis testing given the strength and prolonged nature of his cough.  Chest x-ray has ruled out pneumothorax or pneumonia.  I have also prescribed Tessalon to help control his cough symptoms as needed.  He is referred to pulmonology for the chronic cough.  He does not give history of any aspiration or swallowed foreign body.  Blood work was obtained showing a normal renal and liver function.  Slight transaminitis, he is advised to follow with his doctor for repeat blood work.  He does not drink alcohol, does not take Tylenol.  Patient states he has routine blood work with his doctor for checking lithium levels.        DISPOSITION AND DISCUSSIONS    Escalation of care considered, and ultimately not performed:acute inpatient care management, however at this time, the patient is most appropriate for outpatient management    Barriers to care at this time, including but not limited to:  None .     Decision tools and prescription drugs considered including, but not limited to: Antibiotics were not indicated, no evidence of acute bacterial infection at this time .    FINAL DIAGNOSIS  1. Chronic cough           Electronically signed by: John Lovelace M.D., 2/16/2024 10:39 AM

## 2024-02-16 NOTE — ED TRIAGE NOTES
Pt ambulated to triage with   Chief Complaint   Patient presents with    Cough     For the last 4 weeks, inflamed lungs per dr notes, tried steroid and no change, than started a inhaler with steroid and no change, told to come into ER      Dry non-productive cough.    Pt Informed regarding triage process and verbalized understanding to inform triage tech or RN for any changes in condition. Placed in lobby.

## 2024-02-16 NOTE — ED NOTES
Pt ambulated to the room with steady gait and without assistance. Agree with triage note. Changed into a gown. Call light within reach. No further complaints at this time. Chart up for ERP.

## 2024-02-16 NOTE — ED NOTES
RT contacted for breathing treatment administration. Lab contacted to obtain Pertussis PCR swab supplies.

## 2024-02-16 NOTE — ED NOTES
Pt discharged home as ordered by erp. Pt instructed to follow up with his PCP and return here as needed. Pt verbalized understanding and left ambulating independently with family

## 2024-02-16 NOTE — DISCHARGE INSTRUCTIONS
Continue and finish course of inhaled steroids.  Use albuterol every 4 hours as needed to help with wheezing and cough.  Tessalon may be taken as needed for cough control.  Return for difficulty breathing, bleeding, fever, any concerns.  Please follow-up with your primary care doctor for recheck soon as possible.  Please make appointment with pulmonology for follow-up care if cough does not improve in the next 1 to 2 weeks.

## 2024-02-27 ENCOUNTER — HOSPITAL ENCOUNTER (EMERGENCY)
Facility: MEDICAL CENTER | Age: 24
End: 2024-02-28
Attending: EMERGENCY MEDICINE
Payer: COMMERCIAL

## 2024-02-27 ENCOUNTER — APPOINTMENT (OUTPATIENT)
Dept: RADIOLOGY | Facility: MEDICAL CENTER | Age: 24
End: 2024-02-27
Attending: EMERGENCY MEDICINE
Payer: COMMERCIAL

## 2024-02-27 ENCOUNTER — APPOINTMENT (OUTPATIENT)
Dept: RADIOLOGY | Facility: MEDICAL CENTER | Age: 24
End: 2024-02-27
Payer: COMMERCIAL

## 2024-02-27 DIAGNOSIS — R05.3 CHRONIC COUGH: ICD-10-CM

## 2024-02-27 LAB
ALBUMIN SERPL BCP-MCNC: 4.7 G/DL (ref 3.2–4.9)
ALBUMIN/GLOB SERPL: 1.7 G/DL
ALP SERPL-CCNC: 83 U/L (ref 30–99)
ALT SERPL-CCNC: 89 U/L (ref 2–50)
ANION GAP SERPL CALC-SCNC: 13 MMOL/L (ref 7–16)
APPEARANCE UR: CLEAR
AST SERPL-CCNC: 39 U/L (ref 12–45)
B PERT DNA SPEC QL NAA+PROBE: NORMAL
BASOPHILS # BLD AUTO: 0.9 % (ref 0–1.8)
BASOPHILS # BLD: 0.1 K/UL (ref 0–0.12)
BILIRUB SERPL-MCNC: 0.5 MG/DL (ref 0.1–1.5)
BILIRUB UR QL STRIP.AUTO: NEGATIVE
BUN SERPL-MCNC: 14 MG/DL (ref 8–22)
CALCIUM ALBUM COR SERPL-MCNC: 8.8 MG/DL (ref 8.5–10.5)
CALCIUM SERPL-MCNC: 9.4 MG/DL (ref 8.5–10.5)
CHLORIDE SERPL-SCNC: 103 MMOL/L (ref 96–112)
CO2 SERPL-SCNC: 24 MMOL/L (ref 20–33)
COLOR UR: YELLOW
CREAT SERPL-MCNC: 1.24 MG/DL (ref 0.5–1.4)
EKG IMPRESSION: NORMAL
EOSINOPHIL # BLD AUTO: 0.15 K/UL (ref 0–0.51)
EOSINOPHIL NFR BLD: 1.4 % (ref 0–6.9)
ERYTHROCYTE [DISTWIDTH] IN BLOOD BY AUTOMATED COUNT: 40.8 FL (ref 35.9–50)
FLUAV RNA SPEC QL NAA+PROBE: NEGATIVE
FLUBV RNA SPEC QL NAA+PROBE: NEGATIVE
GFR SERPLBLD CREATININE-BSD FMLA CKD-EPI: 82 ML/MIN/1.73 M 2
GLOBULIN SER CALC-MCNC: 2.8 G/DL (ref 1.9–3.5)
GLUCOSE SERPL-MCNC: 97 MG/DL (ref 65–99)
GLUCOSE UR STRIP.AUTO-MCNC: NEGATIVE MG/DL
HCT VFR BLD AUTO: 48.7 % (ref 42–52)
HGB BLD-MCNC: 17.3 G/DL (ref 14–18)
IMM GRANULOCYTES # BLD AUTO: 0.02 K/UL (ref 0–0.11)
IMM GRANULOCYTES NFR BLD AUTO: 0.2 % (ref 0–0.9)
KETONES UR STRIP.AUTO-MCNC: NEGATIVE MG/DL
LACTATE SERPL-SCNC: 1.7 MMOL/L (ref 0.5–2)
LEUKOCYTE ESTERASE UR QL STRIP.AUTO: NEGATIVE
LITHIUM SERPL-MCNC: 0.2 MMOL/L (ref 0.6–1.2)
LYMPHOCYTES # BLD AUTO: 3.19 K/UL (ref 1–4.8)
LYMPHOCYTES NFR BLD: 28.8 % (ref 22–41)
MCH RBC QN AUTO: 30.9 PG (ref 27–33)
MCHC RBC AUTO-ENTMCNC: 35.5 G/DL (ref 32.3–36.5)
MCV RBC AUTO: 87.1 FL (ref 81.4–97.8)
MICRO URNS: ABNORMAL
MONOCYTES # BLD AUTO: 0.6 K/UL (ref 0–0.85)
MONOCYTES NFR BLD AUTO: 5.4 % (ref 0–13.4)
NEUTROPHILS # BLD AUTO: 7.01 K/UL (ref 1.82–7.42)
NEUTROPHILS NFR BLD: 63.3 % (ref 44–72)
NITRITE UR QL STRIP.AUTO: NEGATIVE
NRBC # BLD AUTO: 0 K/UL
NRBC BLD-RTO: 0 /100 WBC (ref 0–0.2)
PH UR STRIP.AUTO: 8.5 [PH] (ref 5–8)
PLATELET # BLD AUTO: 288 K/UL (ref 164–446)
PMV BLD AUTO: 9.6 FL (ref 9–12.9)
POTASSIUM SERPL-SCNC: 3.6 MMOL/L (ref 3.6–5.5)
PROT SERPL-MCNC: 7.5 G/DL (ref 6–8.2)
PROT UR QL STRIP: NEGATIVE MG/DL
RBC # BLD AUTO: 5.59 M/UL (ref 4.7–6.1)
RBC UR QL AUTO: NEGATIVE
RSV RNA SPEC QL NAA+PROBE: NEGATIVE
SARS-COV-2 RNA RESP QL NAA+PROBE: NOTDETECTED
SODIUM SERPL-SCNC: 140 MMOL/L (ref 135–145)
SP GR UR STRIP.AUTO: 1.01
TROPONIN T SERPL-MCNC: <6 NG/L (ref 6–19)
UROBILINOGEN UR STRIP.AUTO-MCNC: 0.2 MG/DL
WBC # BLD AUTO: 11.1 K/UL (ref 4.8–10.8)

## 2024-02-27 PROCEDURE — 80175 DRUG SCREEN QUAN LAMOTRIGINE: CPT

## 2024-02-27 PROCEDURE — 80053 COMPREHEN METABOLIC PANEL: CPT

## 2024-02-27 PROCEDURE — 83605 ASSAY OF LACTIC ACID: CPT

## 2024-02-27 PROCEDURE — 36415 COLL VENOUS BLD VENIPUNCTURE: CPT

## 2024-02-27 PROCEDURE — 87040 BLOOD CULTURE FOR BACTERIA: CPT | Mod: 91

## 2024-02-27 PROCEDURE — A9270 NON-COVERED ITEM OR SERVICE: HCPCS | Performed by: EMERGENCY MEDICINE

## 2024-02-27 PROCEDURE — 84484 ASSAY OF TROPONIN QUANT: CPT

## 2024-02-27 PROCEDURE — 71045 X-RAY EXAM CHEST 1 VIEW: CPT

## 2024-02-27 PROCEDURE — 93005 ELECTROCARDIOGRAM TRACING: CPT | Performed by: EMERGENCY MEDICINE

## 2024-02-27 PROCEDURE — 71275 CT ANGIOGRAPHY CHEST: CPT

## 2024-02-27 PROCEDURE — 0241U HCHG SARS-COV-2 COVID-19 NFCT DS RESP RNA 4 TRGT ED POC: CPT

## 2024-02-27 PROCEDURE — 93005 ELECTROCARDIOGRAM TRACING: CPT

## 2024-02-27 PROCEDURE — 700117 HCHG RX CONTRAST REV CODE 255: Performed by: EMERGENCY MEDICINE

## 2024-02-27 PROCEDURE — 87086 URINE CULTURE/COLONY COUNT: CPT

## 2024-02-27 PROCEDURE — 85025 COMPLETE CBC W/AUTO DIFF WBC: CPT

## 2024-02-27 PROCEDURE — 99284 EMERGENCY DEPT VISIT MOD MDM: CPT

## 2024-02-27 PROCEDURE — 81003 URINALYSIS AUTO W/O SCOPE: CPT

## 2024-02-27 PROCEDURE — 80178 ASSAY OF LITHIUM: CPT

## 2024-02-27 PROCEDURE — 700102 HCHG RX REV CODE 250 W/ 637 OVERRIDE(OP): Performed by: EMERGENCY MEDICINE

## 2024-02-27 RX ADMIN — IOHEXOL 60 ML: 350 INJECTION, SOLUTION INTRAVENOUS at 22:15

## 2024-02-27 RX ADMIN — LIDOCAINE HYDROCHLORIDE 30 ML: 20 SOLUTION ORAL; TOPICAL at 21:47

## 2024-02-27 ASSESSMENT — FIBROSIS 4 INDEX: FIB4 SCORE: 0.46

## 2024-02-28 VITALS
WEIGHT: 145 LBS | TEMPERATURE: 98.5 F | DIASTOLIC BLOOD PRESSURE: 73 MMHG | BODY MASS INDEX: 19.22 KG/M2 | HEIGHT: 73 IN | OXYGEN SATURATION: 96 % | SYSTOLIC BLOOD PRESSURE: 136 MMHG | RESPIRATION RATE: 19 BRPM | HEART RATE: 92 BPM

## 2024-02-28 NOTE — ED TRIAGE NOTES
"Chief Complaint   Patient presents with    Cough     Continuing since being seen here recently    Fatigue     Pt reports multiple episodes of near syncopal.     Shortness of Breath     Due to the fatigue, pt becomes increasingly SOB during conversation.       Pt to triage via w/c for above complaint. Presents with fatigue, cough and SOB continuing for over 4 weeks. Pt does have apt with pulmonologist, however, pt reports multiple episodes of near syncope doing \"normal things\".     Pt back to lobby, educated on triage process and encourage to alert staff of any changes.     Vitals:    02/27/24 1903   BP: 117/83   Pulse: (!) 124   Resp: (!) 24   Temp: 36.8 °C (98.3 °F)   SpO2: 100%           "

## 2024-02-28 NOTE — ED NOTES
Pt ambulate to green 24. On monitor, this RN agree with triage. Call light in reach, safety measures in place.

## 2024-02-28 NOTE — ED PROVIDER NOTES
ED Provider Note    CHIEF COMPLAINT  Chief Complaint   Patient presents with    Cough     Continuing since being seen here recently    Fatigue     Pt reports multiple episodes of near syncopal.     Shortness of Breath     Due to the fatigue, pt becomes increasingly SOB during conversation.       EXTERNAL RECORDS REVIEWED  Inpatient Notes most recent ER visit 2/16/2024.  This was reviewed.  Patient presented then for chronic cough.    HPI/ROS  LIMITATION TO HISTORY   Select: : None  OUTSIDE HISTORIAN(S):  Parent mother at bedside providing additional history and concern    Kabriel Duane Hoover is a 23 y.o. male who presents to the emergency department for chronic cough and fatigue.  States that he has now been on well for months.  Ongoing symptoms as above.  It is believed at this point that it is likely persistent postviral symptoms.  He has been using an inhaler and also inhaled steroids with no significant relief.  He was recently in the ER and provided with additional Tessalon and antacid.  None of this has helped his symptoms.    He does have upcoming outpatient appointment with PCP as well as pulmonology.    PAST MEDICAL HISTORY   has a past medical history of Hearing loss, Patellofemoral arthralgia of both knees, Usher syndrome, and Vision loss.    SURGICAL HISTORY   has a past surgical history that includes other orthopedic surgery (2012).    FAMILY HISTORY  Family History   Problem Relation Age of Onset    Psychiatric Illness Father         deperession    Hypertension Father     Psychiatric Illness Brother     Hypertension Paternal Grandmother     Hypertension Paternal Grandfather     Cancer Neg Hx        SOCIAL HISTORY  Social History     Tobacco Use    Smoking status: Never    Smokeless tobacco: Never   Vaping Use    Vaping Use: Never used   Substance and Sexual Activity    Alcohol use: No    Drug use: No    Sexual activity: Yes     Partners: Female     Birth control/protection: Pill       CURRENT  "MEDICATIONS  Home Medications       Reviewed by Radha Silva R.N. (Registered Nurse) on 02/27/24 at 1924  Med List Status: Not Addressed     Medication Last Dose Status   albuterol 108 (90 Base) MCG/ACT Aero Soln inhalation aerosol  Active   benzonatate (TESSALON) 100 MG Cap  Active   buPROPion (WELLBUTRIN XL) 150 MG XL tablet  Active   lamotrigine (LAMICTAL) 200 MG tablet  Active                    ALLERGIES  Allergies   Allergen Reactions    Zoloft [Sertraline]        PHYSICAL EXAM  VITAL SIGNS: /73   Pulse 92   Temp 36.9 °C (98.5 °F) (Temporal)   Resp 19   Ht 1.854 m (6' 1\")   Wt 65.8 kg (145 lb)   SpO2 96%   BMI 19.13 kg/m²          Pulse ox interpretation: I interpret this pulse ox as normal.  Constitutional: Alert in no apparent distress.  Persistent dry cough  HENT: No signs of trauma, Bilateral external ears normal, Nose normal.  Posterior pharynx is clear  Eyes: Pupils are equal and reactive  Neck: Normal range of motion, No tenderness, Supple  Cardiovascular: Regular rate and rhythm, no murmurs.   Thorax & Lungs: Normal breath sounds, No respiratory distress, No wheezing, No chest tenderness.   Abdomen: Bowel sounds normal, Soft, No tenderness  Skin: Warm, Dry, No erythema, No rash.   Extremities: Intact distal pulses, No edema, No tenderness  Musculoskeletal: Good range of motion in all major joints. No tenderness to palpation or major deformities noted.   Neurologic: Alert , Normal motor function, Normal sensory function, No focal deficits noted.   Psychiatric: Affect normal, Judgment normal, Mood normal.         DIAGNOSTIC STUDIES / PROCEDURES    LABS  Results for orders placed or performed during the hospital encounter of 02/27/24   Lactic Acid   Result Value Ref Range    Lactic Acid 1.7 0.5 - 2.0 mmol/L   CBC with Differential   Result Value Ref Range    WBC 11.1 (H) 4.8 - 10.8 K/uL    RBC 5.59 4.70 - 6.10 M/uL    Hemoglobin 17.3 14.0 - 18.0 g/dL    Hematocrit 48.7 42.0 - 52.0 %    " MCV 87.1 81.4 - 97.8 fL    MCH 30.9 27.0 - 33.0 pg    MCHC 35.5 32.3 - 36.5 g/dL    RDW 40.8 35.9 - 50.0 fL    Platelet Count 288 164 - 446 K/uL    MPV 9.6 9.0 - 12.9 fL    Neutrophils-Polys 63.30 44.00 - 72.00 %    Lymphocytes 28.80 22.00 - 41.00 %    Monocytes 5.40 0.00 - 13.40 %    Eosinophils 1.40 0.00 - 6.90 %    Basophils 0.90 0.00 - 1.80 %    Immature Granulocytes 0.20 0.00 - 0.90 %    Nucleated RBC 0.00 0.00 - 0.20 /100 WBC    Neutrophils (Absolute) 7.01 1.82 - 7.42 K/uL    Lymphs (Absolute) 3.19 1.00 - 4.80 K/uL    Monos (Absolute) 0.60 0.00 - 0.85 K/uL    Eos (Absolute) 0.15 0.00 - 0.51 K/uL    Baso (Absolute) 0.10 0.00 - 0.12 K/uL    Immature Granulocytes (abs) 0.02 0.00 - 0.11 K/uL    NRBC (Absolute) 0.00 K/uL   Complete Metabolic Panel   Result Value Ref Range    Sodium 140 135 - 145 mmol/L    Potassium 3.6 3.6 - 5.5 mmol/L    Chloride 103 96 - 112 mmol/L    Co2 24 20 - 33 mmol/L    Anion Gap 13.0 7.0 - 16.0    Glucose 97 65 - 99 mg/dL    Bun 14 8 - 22 mg/dL    Creatinine 1.24 0.50 - 1.40 mg/dL    Calcium 9.4 8.5 - 10.5 mg/dL    Correct Calcium 8.8 8.5 - 10.5 mg/dL    AST(SGOT) 39 12 - 45 U/L    ALT(SGPT) 89 (H) 2 - 50 U/L    Alkaline Phosphatase 83 30 - 99 U/L    Total Bilirubin 0.5 0.1 - 1.5 mg/dL    Albumin 4.7 3.2 - 4.9 g/dL    Total Protein 7.5 6.0 - 8.2 g/dL    Globulin 2.8 1.9 - 3.5 g/dL    A-G Ratio 1.7 g/dL   Urinalysis    Specimen: Urine   Result Value Ref Range    Color Yellow     Character Clear     Specific Gravity 1.012 <1.035    Ph 8.5 (A) 5.0 - 8.0    Glucose Negative Negative mg/dL    Ketones Negative Negative mg/dL    Protein Negative Negative mg/dL    Bilirubin Negative Negative    Urobilinogen, Urine 0.2 Negative    Nitrite Negative Negative    Leukocyte Esterase Negative Negative    Occult Blood Negative Negative    Micro Urine Req see below    LITHIUM   Result Value Ref Range    Lithium 0.2 (L) 0.6 - 1.2 mmol/L   TROPONIN   Result Value Ref Range    Troponin T <6 6 - 19 ng/L    ESTIMATED GFR   Result Value Ref Range    GFR (CKD-EPI) 82 >60 mL/min/1.73 m 2   EKG   Result Value Ref Range    Report       [facility]    Test Date:  2024  Pt Name:    SHWETHA KUMAR               Department: ER  MRN:        [id number]                      Room:  Gender:     Male                         Technician: [id number]  :        [id number]                   Requested By:ER TRIAGE PROTOCOL  Order #:    [id number]                    Reading MD: Errol Molina    Measurements  Intervals                                Axis  Rate:       106                          P:          78  GA:         147                          QRS:        92  QRSD:       93                           T:          76  QT:         343  QTc:        456    Interpretive Statements  Sinus tachycardia  Right atrial enlargement  Borderline right axis deviation  ST elev, probable normal early repol pattern  No previous ECG available for comparison  Electronically Signed On 2024 21:04:25 PST by Errol Molina     POC CoV-2, FLU A/B, RSV by PCR   Result Value Ref Range    POC Influenza A RNA, PCR Negative Negative    POC Influenza B RNA, PCR Negative Negative    POC RSV, by PCR Negative Negative    POC SARS-CoV-2, PCR NotDetected          RADIOLOGY  I have independently interpreted the diagnostic imaging associated with this visit and am waiting the final reading from the radiologist.   My preliminary interpretation is as follows: No acute consolidative process or pneumothorax on chest x-ray  Radiologist interpretation:   CT-CTA CHEST PULMONARY ARTERY W/ RECONS   Final Result         1. No CT evidence of pulmonary embolism.      2. No airspace opacity. No pleural effusion. No pneumothorax.      DX-CHEST-PORTABLE (1 VIEW)   Final Result      - -            COURSE & MEDICAL DECISION MAKING    ED Observation Status? No; Patient does not meet criteria for ED Observation.     INITIAL ASSESSMENT, COURSE AND  PLAN  Care Narrative: 23-year-old representing to the ER for chronic cough.  Will provide additional workup today given ongoing symptoms to include that of CT imaging of the chest    DISPOSITION AND DISCUSSIONS  I have discussed management of the patient with the following physicians and NBA's: None    Discussion of management with other QHP or appropriate source(s): None     Escalation of care considered, and ultimately not performed:acute inpatient care management, however at this time, the patient is most appropriate for outpatient management    Barriers to care at this time, including but not limited to:  None .     23-year-old male presenting to the emerged part with ongoing cough.  History as above.  No significant changes but no improvement either.  Today's workup is reassuring.  Hematologic evaluation is benign.  White count is negative.  No anemia.  Electrolytes are okay.  Troponin negative.  EKG negative.  CT imaging also without evidence of any acute persistent inflammatory/infectious or pulmonary embolus findings.  Given the patient's polypharmacy use I have additionally added Lamictal level and lithium levels although less likely contributory.  From with consideration of GI/GERD etiology of cough GI cocktail was provided with no significant change.    At this point I will have the patient continue with his outpatient plan to follow-up with PCP as well as pulmonology for ongoing outpatient care.  I strongly encouraged him to consider continuing his inhalers and antitussives as previously prescribed.  He will return to ER with any change or worsening.        FINAL DIAGNOSIS  1. Chronic cough           Electronically signed by: Errol Molina M.D., 2/27/2024 9:22 PM

## 2024-02-29 LAB
BACTERIA UR CULT: NORMAL
LAMOTRIGINE SERPL-MCNC: 5.2 UG/ML (ref 3–15)
SIGNIFICANT IND 70042: NORMAL
SITE SITE: NORMAL
SOURCE SOURCE: NORMAL

## 2024-03-03 LAB
BACTERIA BLD CULT: NORMAL
BACTERIA BLD CULT: NORMAL
SIGNIFICANT IND 70042: NORMAL
SIGNIFICANT IND 70042: NORMAL
SITE SITE: NORMAL
SITE SITE: NORMAL
SOURCE SOURCE: NORMAL
SOURCE SOURCE: NORMAL

## 2024-03-11 ENCOUNTER — ANCILLARY PROCEDURE (OUTPATIENT)
Dept: CARDIOLOGY | Facility: MEDICAL CENTER | Age: 24
End: 2024-03-11
Attending: PHYSICIAN ASSISTANT
Payer: COMMERCIAL

## 2024-03-11 DIAGNOSIS — R06.9 ABNORMAL RESPIRATORY RATE: ICD-10-CM

## 2024-03-11 LAB
LV EJECT FRACT  99904: 60
LV EJECT FRACT MOD 2C 99903: 58.54
LV EJECT FRACT MOD 4C 99902: 65.81
LV EJECT FRACT MOD BP 99901: 59.29

## 2024-03-11 PROCEDURE — 93306 TTE W/DOPPLER COMPLETE: CPT | Mod: 26 | Performed by: INTERNAL MEDICINE

## 2024-03-11 PROCEDURE — 93306 TTE W/DOPPLER COMPLETE: CPT

## 2024-07-28 ENCOUNTER — APPOINTMENT (OUTPATIENT)
Dept: RADIOLOGY | Facility: IMAGING CENTER | Age: 24
End: 2024-07-28
Attending: NURSE PRACTITIONER
Payer: COMMERCIAL

## 2024-07-28 ENCOUNTER — OFFICE VISIT (OUTPATIENT)
Dept: URGENT CARE | Facility: CLINIC | Age: 24
End: 2024-07-28
Payer: COMMERCIAL

## 2024-07-28 VITALS
DIASTOLIC BLOOD PRESSURE: 90 MMHG | HEART RATE: 96 BPM | HEIGHT: 73 IN | BODY MASS INDEX: 20.54 KG/M2 | SYSTOLIC BLOOD PRESSURE: 130 MMHG | WEIGHT: 155 LBS | OXYGEN SATURATION: 97 % | RESPIRATION RATE: 18 BRPM | TEMPERATURE: 98.7 F

## 2024-07-28 DIAGNOSIS — R07.9 CHEST PAIN, UNSPECIFIED TYPE: ICD-10-CM

## 2024-07-28 DIAGNOSIS — R20.2 ARM PARESTHESIA, LEFT: ICD-10-CM

## 2024-07-28 PROCEDURE — 3075F SYST BP GE 130 - 139MM HG: CPT | Performed by: NURSE PRACTITIONER

## 2024-07-28 PROCEDURE — 3080F DIAST BP >= 90 MM HG: CPT | Performed by: NURSE PRACTITIONER

## 2024-07-28 PROCEDURE — 71046 X-RAY EXAM CHEST 2 VIEWS: CPT | Mod: TC | Performed by: NURSE PRACTITIONER

## 2024-07-28 PROCEDURE — 99202 OFFICE O/P NEW SF 15 MIN: CPT | Performed by: NURSE PRACTITIONER

## 2024-07-28 RX ORDER — ASPIRIN 81 MG/1
324 TABLET, CHEWABLE ORAL ONCE
Status: COMPLETED | OUTPATIENT
Start: 2024-07-28 | End: 2024-07-28

## 2024-07-28 RX ADMIN — ASPIRIN 324 MG: 81 TABLET, CHEWABLE ORAL at 11:53

## 2024-07-28 ASSESSMENT — ENCOUNTER SYMPTOMS
MYALGIAS: 0
CHILLS: 0
VOMITING: 0
COUGH: 1
NAUSEA: 0
SENSORY CHANGE: 1
PALPITATIONS: 0
SHORTNESS OF BREATH: 1
FEVER: 0
DIAPHORESIS: 0
NECK PAIN: 0
TINGLING: 1

## 2024-07-28 ASSESSMENT — FIBROSIS 4 INDEX: FIB4 SCORE: 0.37

## 2025-05-23 ENCOUNTER — ANCILLARY PROCEDURE (OUTPATIENT)
Dept: CARDIOLOGY | Facility: MEDICAL CENTER | Age: 25
End: 2025-05-23
Attending: FAMILY MEDICINE
Payer: COMMERCIAL

## 2025-05-23 DIAGNOSIS — R00.2 PALPITATIONS: ICD-10-CM

## 2025-05-23 DIAGNOSIS — R00.0 TACHYCARDIA: ICD-10-CM

## 2025-05-23 LAB
LV EJECT FRACT  99904: 55
LV EJECT FRACT MOD 2C 99903: 40.98
LV EJECT FRACT MOD 4C 99902: 53.74
LV EJECT FRACT MOD BP 99901: 44.98

## 2025-05-23 PROCEDURE — 93306 TTE W/DOPPLER COMPLETE: CPT

## 2025-05-23 PROCEDURE — 93306 TTE W/DOPPLER COMPLETE: CPT | Mod: 26 | Performed by: INTERNAL MEDICINE
